# Patient Record
Sex: MALE | Race: BLACK OR AFRICAN AMERICAN | NOT HISPANIC OR LATINO | Employment: FULL TIME | URBAN - METROPOLITAN AREA
[De-identification: names, ages, dates, MRNs, and addresses within clinical notes are randomized per-mention and may not be internally consistent; named-entity substitution may affect disease eponyms.]

---

## 2021-12-27 ENCOUNTER — HISTORICAL (OUTPATIENT)
Dept: ADMINISTRATIVE | Facility: HOSPITAL | Age: 51
End: 2021-12-27

## 2022-01-06 ENCOUNTER — HISTORICAL (OUTPATIENT)
Dept: ADMINISTRATIVE | Facility: HOSPITAL | Age: 52
End: 2022-01-06

## 2022-04-10 ENCOUNTER — HISTORICAL (OUTPATIENT)
Dept: ADMINISTRATIVE | Facility: HOSPITAL | Age: 52
End: 2022-04-10
Payer: COMMERCIAL

## 2022-04-30 VITALS
DIASTOLIC BLOOD PRESSURE: 97 MMHG | HEIGHT: 73 IN | BODY MASS INDEX: 31.85 KG/M2 | OXYGEN SATURATION: 98 % | WEIGHT: 240.31 LBS | BODY MASS INDEX: 31.85 KG/M2 | WEIGHT: 240.31 LBS | SYSTOLIC BLOOD PRESSURE: 157 MMHG | DIASTOLIC BLOOD PRESSURE: 93 MMHG | SYSTOLIC BLOOD PRESSURE: 160 MMHG | WEIGHT: 240.31 LBS | BODY MASS INDEX: 31.85 KG/M2 | DIASTOLIC BLOOD PRESSURE: 101 MMHG | HEIGHT: 73 IN | HEIGHT: 73 IN | SYSTOLIC BLOOD PRESSURE: 160 MMHG

## 2022-05-02 NOTE — HISTORICAL [facility]ER
This is a historical note converted from [employer]. Formatting and pictures may have been removed.  Please reference [employer] for original formatting and attached multimedia. Chief Complaint  knee pain, left. Playing basket ball  History of Present Illness  51-year-old male presents with?pain to the?left knee.? States twisted knee while playing basketball?2 days ago.  Review of Systems  Constitutional_no fever, fatigue, weakness  Musculoskeletal_left knee pain  Integumentary_no skin rash or abnormal lesion  Neurologic_no headache, no dizziness, no weakness or numbness  ?  Physical Exam  Vitals & Measurements  T:?37? ?C (Tympanic)? HR:?104(Peripheral)? RR:?20? BP:?160/97? SpO2:?98%?  HT:?185.00?cm? WT:?109.000?kg? BMI:?31.85?  General_well-developed well-nourished in no acute distress  Musculoskeletal_tenderness to palpation to the medial?left knee. ?Mild swelling without abrasion contusion or deformity.? Adequate sensation strong distal pulses  Integumentary_no rashes or skin lesions present  Neurologic_ cranial nerves intact, no signs of peripheral neurological deficit, motor/sensory function intact?  ?  Assessment/Plan  1.?Left knee pain?M25.562  Modify activity as necessary,?wear knee support  Use medications either over-the-counter or prescription as prescribed/needed  Contact this clinic or your primary care provider if not improved with this treatment plan over the next 7 days  We will notify of the final radiology x-ray report result. ?I will refer to orthopedics.  Ordered:  diclofenac, 75 mg = 1 tab(s), Oral, BID, # 20 tab(s), 0 Refill(s), Pharmacy: [id number], 185, cm, Height/Length Dosing, 12/27/21 10:49:00 CST, 109, kg, Weight Dosing, 12/27/21 10:49:00 CST  Office/Outpatient Visit Level 3 New [id number] PC, Left knee pain, 12/27/21 11:52:00 CST  Patella Sleeve () PC, 12/27/21 11:52:00 CST, [facility] [place], Routine, 12/27/21 11:52:00 CST, Left knee pain  ?  Referrals  Ambulatory  Referral, Specialty: Orthopedic Surgery, Start: 12/27/21 11:51:00 CST, Left knee pain   Problem List/Past Medical History  Ongoing  HTN - Hypertension  Obesity  Historical  Hypertension  Procedure/Surgical History  None   Medications  AMLODIPINE BESYLATE 10 MG TAB, 10 mg= 1 tab(s), Oral, Daily  METOPROLOL SUCC ER 50 MG TAB, 50 mg= 1 tab(s), Oral, Daily  Allergies  No Known Medication Allergies  Social History  Abuse/Neglect  No, 12/27/2021  Alcohol  Past, 12/09/2017  Tobacco  Never (less than 100 in lifetime), No, 12/27/2021  Never smoker, 12/09/2017  Family History  Heart attack: Father.  Health Maintenance  Health Maintenance  ???Pending?(in the next year)  ??? ??OverDue  ??? ? ? ?Alcohol Misuse Screening due??01/02/21??and every 1??year(s)  ??? ??Due?  ??? ? ? ?ADL Screening due??12/27/21??and every 1??year(s)  ??? ? ? ?Aspirin Therapy for CVD Prevention due??12/27/21??and every 1??year(s)  ??? ? ? ?Hypertension Management-Education due??12/27/21??and every 1??year(s)  ??? ? ? ?Tetanus Vaccine due??12/27/21??and every 10??year(s)  ??? ??Due In Future?  ??? ? ? ?Obesity Screening not due until??01/01/22??and every 1??year(s)  ???Satisfied?(in the past 1 year)  ??? ??Satisfied?  ??? ? ? ?Blood Pressure Screening on??12/27/21.??Satisfied by Jodie Stafford.  ??? ? ? ?Body Mass Index Check on??12/27/21.??Satisfied by Jodie Stafford.  ??? ? ? ?Hypertension Management-Blood Pressure on??12/27/21.??Satisfied by Jodie Stafford.  ??? ? ? ?Influenza Vaccine on??12/27/21.??Satisfied by Jodie Stafford.  ??? ? ? ?Obesity Screening on??12/27/21.??Satisfied by Jodie Stafford  ?

## 2022-05-02 NOTE — HISTORICAL [facility]
This is a historical note converted from [employer]. Formatting and pictures may have been removed.  Please reference [employer] for original formatting and attached multimedia. Chief Complaint  NP PRESENTS FOR L KNEE INJURED WHILE PLAYING BASKETBALL ON 12/25/21  History of Present Illness  Patient is a 51-year-old male who presents to?the office today complaining of left knee pain.? He was playing basketball?on?December 25, 2021?when he?twisted his knee?developed pain in the medial aspect of the left knee.? He was seen at an urgent care?2 days after this and was?started on?diclofenac.? He states that the pain in the medial aspect of his knee has been improving.? He describes pain as a dull, aching type pain.? It is worse with standing for long period of time with walking for long distances.? It is alleviated with rest and ice. ?Stiff first thing in the morning?and then improves as his knee warms up.? He, rates pain at?4 out of 10 in intensity.? It does affect his actives of daily living and his ability to walk for long distances.? He denies any?fever, chills, night sweats. ?Denies any redness, warmth, swelling about the left knee.? Is here today for evaluation recommendations regarding his left knee pain.? Standing AP, Rangel, and sunrise views of bilateral knees and lateral view of the left knee were obtained in the office today.  Review of Systems  Constitutional: No fever, fatigue, weakness  Eye: No vision loss, eye redness, drainage, or pain  ENMT: No sore throat, ear pain, sinus pain/congestion, nasal congestion/drainage  Respiratory: No cough, no wheezing, no shortness of breath  Cardiovascular: No chest pain, no palpitations, no edema  Gastrointestinal: No nausea, vomiting, or diarrhea. ?No abdominal pain  Genitourinary: No dysuria, no urinary frequency or urgency, no hematuria  Hema/lymph: No abnormal bruising or bleeding  Endocrine: No heat or cold intolerance, no excessive thirst or excessive  urination  Musculoskeletal: Left knee pain  Integumentary: No skin rash or abnormal lesion  Neurologic: No headache, no dizziness, no weakness or numbness  Physical Exam  Vitals & Measurements  BP:?160/101?  HT:?185.00?cm? WT:?109.000?kg? BMI:?31.85?  General: No acute distress, alert and oriented, healthy appearing  HEENT: Head is atraumatic, mucous membranes are moist  Neck: Supple, no JVD  Cardiovascular: Palpable dorsalis pedis and posterior tibial pulses, regular rate and rhythm to those pulses  Lungs: No increased work of breathing  Skin: No rashes appreciated  Neurologic: Can flex and extend knees, ankles, and toes. ?Sensation is grossly intact  ?  Focused orthopedic exam:  Left?knee Exam:  Skin intact. ?No wounds over the knee. ?No deformity. ?No swelling. ?No erythema or warmth about the knee. ?No drainage.  Tender to palpation over medial joint line, medial hamstring insertion  No effusion present  Flexion to 135 degrees  Extension to 0 degrees  Stable to varus stress. ?Stable to valgus stress.  Firm endpoint Lachman exam. ?Firm endpoint posterior drawer test.  Negative McMurrays test  No?crepitus with flexion and extension of the knee  Sensation intact to light touch over the dorsal and plantar aspects of the foot.  Dorsiflexion and plantarflexion intact at the ankle and hallux.  Palpable dorsalis pedis pulse.  Assessment/Plan  1.?Left hamstring muscle strain?S76.312A  ?Patient is a 51-year-old male with?medial knee pain after twisting his knee while playing basketball.? Does not really have much swelling in the left knee at this time. ?His pain has improved significantly since the injury.? He has clinical findings consistent with?a strain of the insertion of the medial hamstring of the left knee as well as?medial meniscal injury. ?As result of recommended conservative management at this time. ?To continue to weight-bear as tolerated to the left lower extremity.? He was given a prescription for?physical  therapy for home exercise program?to work on stretching and strengthening of the muscles in his left leg.? He was given a prescription for?diclofenac gel to help with the inflammation.? I will see him back in the office in 1 week to reevaluate his left knee?and his progress with his home exercise program.? No new x-rays are needed at that office visit.? Patient was educated on his diagnosis prognosis. ?All questions were answered to his satisfaction.  ?  Components of this note were documented using voice recognition systems and are subject to errors not corrected at proof reading. ?Please contact the author for any clarifications.  Ordered:  diclofenac topical, 1 renetta, TOP, QID, PRN PRN as needed for pain, # 100 gm, 0 Refill(s), [facility]acy: [id number], 185, cm, Height/Length Dosing, 01/06/22 9:31:00 CST, 109, kg, Weight Dosing, 01/06/22 9:31:00 CST  Clinic Follow up, *Est. 01/13/22 3:00:00 CST, Order for future visit, Left hamstring muscle strain  Acute meniscal injury of left knee  Left knee pain, Orthopaedics  Office/Outpatient Visit Level 3 New [id number] PC, Left hamstring muscle strain  Acute meniscal injury of left knee  Left knee pain, Orthopaedics Clinic, 01/06/22 9:59:00 CST  ?  2.?Acute meniscal injury of left knee?S83.8X2A  Ordered:  diclofenac topical, 1 renetta, TOP, QID, PRN PRN as needed for pain, # 100 gm, 0 Refill(s), [facility]acy: [facility] #[id number], 185, cm, Height/Length Dosing, 01/06/22 9:31:00 CST, 109, kg, Weight Dosing, 01/06/22 9:31:00 CST  Clinic Follow up, *Est. 01/13/22 3:00:00 CST, Order for future visit, Left hamstring muscle strain  Acute meniscal injury of left knee  Left knee pain, Orthopaedics  Office/Outpatient Visit Level 3 New [id number] PC, Left hamstring muscle strain  Acute meniscal injury of left knee  Left knee pain, Orthopaedics Clinic, 01/06/22 9:59:00 CST  ?  Orders:  XR Knee Left 4 or More Views, Routine, 01/06/22 9:37:00 CST, None, Ambulatory, Rad  Type, Left knee pain, Not Scheduled, 01/06/22 9:37:00 CST  Referrals  PT/OT Ambulatory Referral, Specialty: Physical Therapy, Reason: left hamstring strain left meniscal injury, Start: 01/06/22 10:00:00 CST, Home Exercise program, Instructions: ****General PT Orders****, Left hamstring muscle strain  Acute meniscal injury of left knee  Left kn...  Clinic Follow up, *Est. 01/13/22 3:00:00 CST, Order for future visit, Left hamstring muscle strain  Acute meniscal injury of left knee  Left knee pain, [facility]   Problem List/Past Medical History  Ongoing  HTN - Hypertension  Obesity  Historical  Hypertension  Procedure/Surgical History  None   Medications  AMLODIPINE BESYLATE 10 MG TAB, 10 mg= 1 tab(s), Oral, Daily  diclofenac 1% topical gel, 1 renetta, TOP, QID, PRN  diclofenac sodium 75 mg oral delayed release tablet, 75 mg= 1 tab(s), Oral, BID  METOPROLOL SUCC ER 50 MG TAB, 50 mg= 1 tab(s), Oral, Daily  Allergies  No Known Medication Allergies  Social History  Abuse/Neglect  No, 12/27/2021  Alcohol  Past, 12/09/2017  Tobacco  Never (less than 100 in lifetime), No, 12/27/2021  Never smoker, 12/09/2017  Family History  Heart attack: Father.  Health Maintenance  Health Maintenance  ???Pending?(in the next year)  ??? ??OverDue  ??? ? ? ?Hypertension Management-BMP due??08/09/13??and every 1??year(s)  ??? ? ? ?Diabetes Screening due??08/09/15??and every 3??year(s)  ??? ? ? ?Influenza Vaccine due??10/01/21??and every 1??day(s)  ??? ??Due?  ??? ? ? ?Alcohol Misuse Screening due??01/02/22??and every 1??year(s)  ??? ? ? ?ADL Screening due??01/06/22??and every 1??year(s)  ??? ? ? ?Aspirin Therapy for CVD Prevention due??01/06/22??and every 1??year(s)  ??? ? ? ?Colorectal Screening due??01/06/22??Unknown Frequency  ??? ? ? ?Depression Screening due??01/06/22??Unknown Frequency  ??? ? ? ?Hypertension Management-Education due??01/06/22??and every 1??year(s)  ??? ? ? ?Lipid Screening due??01/06/22??Unknown Frequency  ??? ? ?  ?Tetanus Vaccine due??01/06/22??and every 10??year(s)  ??? ? ? ?Zoster Vaccine due??01/06/22??Unknown Frequency  ??? ??Due In Future?  ??? ? ? ?Obesity Screening not due until??01/01/23??and every 1??year(s)  ???Satisfied?(in the past 1 year)  ??? ??Satisfied?  ??? ? ? ?Blood Pressure Screening on??01/06/22.??Satisfied by [facility]  ??? ? ? ?Body Mass Index Check on??01/06/22.??Satisfied by [facility]  ??? ? ? ?Hypertension Management-Blood Pressure on??01/06/22.??Satisfied by [facility]  ??? ? ? ?Influenza Vaccine on??01/06/22.??Satisfied by [facility]  ??? ? ? ?Obesity Screening on??01/06/22.??Satisfied by [facility]  ?  Diagnostic Results  Standing AP, Rangel, and sunrise view of the bilateral knees and lateral view of the left knee were obtained in the office today.? These show no acute osseous abnormality.? No fracture.? Mild joint space narrowing of the medial compartment of the left knee.? Osteophyte formation about the medial tibial condyle.? Mild degenerative changes in the left knee.

## 2022-06-06 ENCOUNTER — OFFICE VISIT (OUTPATIENT)
Dept: URGENT CARE | Facility: CLINIC | Age: 52
End: 2022-06-06
Payer: COMMERCIAL

## 2022-06-06 VITALS
OXYGEN SATURATION: 98 % | SYSTOLIC BLOOD PRESSURE: 169 MMHG | BODY MASS INDEX: 28.44 KG/M2 | HEIGHT: 72 IN | DIASTOLIC BLOOD PRESSURE: 107 MMHG | HEART RATE: 85 BPM | TEMPERATURE: 98 F | WEIGHT: 210 LBS

## 2022-06-06 DIAGNOSIS — M54.50 ACUTE RIGHT-SIDED LOW BACK PAIN WITHOUT SCIATICA: ICD-10-CM

## 2022-06-06 LAB
BILIRUB UR QL STRIP: NEGATIVE
GLUCOSE UR QL STRIP: NEGATIVE
KETONES UR QL STRIP: NEGATIVE
LEUKOCYTE ESTERASE UR QL STRIP: NEGATIVE
PH, POC UA: 5
POC BLOOD, URINE: NEGATIVE
POC NITRATES, URINE: NEGATIVE
PROT UR QL STRIP: NEGATIVE
SP GR UR STRIP: 1.02 (ref 1–1.03)
UROBILINOGEN UR STRIP-ACNC: NORMAL (ref 0.3–2.2)

## 2022-06-06 PROCEDURE — 1160F RVW MEDS BY RX/DR IN RCRD: CPT | Mod: CPTII,,, | Performed by: NURSE PRACTITIONER

## 2022-06-06 PROCEDURE — 3077F SYST BP >= 140 MM HG: CPT | Mod: CPTII,,, | Performed by: NURSE PRACTITIONER

## 2022-06-06 PROCEDURE — 1159F PR MEDICATION LIST DOCUMENTED IN MEDICAL RECORD: ICD-10-PCS | Mod: CPTII,,, | Performed by: NURSE PRACTITIONER

## 2022-06-06 PROCEDURE — 3008F BODY MASS INDEX DOCD: CPT | Mod: CPTII,,, | Performed by: NURSE PRACTITIONER

## 2022-06-06 PROCEDURE — 1159F MED LIST DOCD IN RCRD: CPT | Mod: CPTII,,, | Performed by: NURSE PRACTITIONER

## 2022-06-06 PROCEDURE — 81003 POCT URINALYSIS, DIPSTICK, AUTOMATED, W/O SCOPE: ICD-10-PCS | Mod: QW,,, | Performed by: NURSE PRACTITIONER

## 2022-06-06 PROCEDURE — 99213 PR OFFICE/OUTPT VISIT, EST, LEVL III, 20-29 MIN: ICD-10-PCS | Mod: ,,, | Performed by: NURSE PRACTITIONER

## 2022-06-06 PROCEDURE — 99213 OFFICE O/P EST LOW 20 MIN: CPT | Mod: ,,, | Performed by: NURSE PRACTITIONER

## 2022-06-06 PROCEDURE — 4010F ACE/ARB THERAPY RXD/TAKEN: CPT | Mod: CPTII,,, | Performed by: NURSE PRACTITIONER

## 2022-06-06 PROCEDURE — 4010F PR ACE/ARB THEARPY RXD/TAKEN: ICD-10-PCS | Mod: CPTII,,, | Performed by: NURSE PRACTITIONER

## 2022-06-06 PROCEDURE — 3080F PR MOST RECENT DIASTOLIC BLOOD PRESSURE >= 90 MM HG: ICD-10-PCS | Mod: CPTII,,, | Performed by: NURSE PRACTITIONER

## 2022-06-06 PROCEDURE — 1160F PR REVIEW ALL MEDS BY PRESCRIBER/CLIN PHARMACIST DOCUMENTED: ICD-10-PCS | Mod: CPTII,,, | Performed by: NURSE PRACTITIONER

## 2022-06-06 PROCEDURE — 81003 URINALYSIS AUTO W/O SCOPE: CPT | Mod: QW,,, | Performed by: NURSE PRACTITIONER

## 2022-06-06 PROCEDURE — 3077F PR MOST RECENT SYSTOLIC BLOOD PRESSURE >= 140 MM HG: ICD-10-PCS | Mod: CPTII,,, | Performed by: NURSE PRACTITIONER

## 2022-06-06 PROCEDURE — 3008F PR BODY MASS INDEX (BMI) DOCUMENTED: ICD-10-PCS | Mod: CPTII,,, | Performed by: NURSE PRACTITIONER

## 2022-06-06 PROCEDURE — 3080F DIAST BP >= 90 MM HG: CPT | Mod: CPTII,,, | Performed by: NURSE PRACTITIONER

## 2022-06-06 RX ORDER — AMLODIPINE BESYLATE 5 MG/1
5 TABLET ORAL EVERY MORNING
COMMUNITY
Start: 2022-04-12

## 2022-06-06 RX ORDER — DICLOFENAC SODIUM 75 MG/1
75 TABLET, DELAYED RELEASE ORAL 2 TIMES DAILY PRN
Qty: 20 TABLET | Refills: 0 | Status: SHIPPED | OUTPATIENT
Start: 2022-06-06 | End: 2022-06-16

## 2022-06-06 RX ORDER — METHYLPREDNISOLONE 4 MG/1
TABLET ORAL
Qty: 21 EACH | Refills: 0 | Status: SHIPPED | OUTPATIENT
Start: 2022-06-06

## 2022-06-06 RX ORDER — CYCLOBENZAPRINE HCL 10 MG
10 TABLET ORAL 3 TIMES DAILY PRN
Qty: 30 TABLET | Refills: 0 | Status: SHIPPED | OUTPATIENT
Start: 2022-06-06 | End: 2022-06-16

## 2022-06-06 NOTE — PATIENT INSTRUCTIONS
Modify activity and gentle stretching  Take prescription medication as directed or  Tylenol  OTC as directed for pain  Follow-up with your primary care provider if symptoms worsen or persist as instructed

## 2022-06-06 NOTE — PROGRESS NOTES
Subjective:       Patient ID: Sreekanth Coto is a 52 y.o. male.    Vitals:  height is 6' (1.829 m) and weight is 95.3 kg (210 lb). His temperature is 98.4 °F (36.9 °C). His blood pressure is 169/107 (abnormal) and his pulse is 85. His oxygen saturation is 98%.     Chief Complaint: Back Pain (Lower right sided back pain.)    Lower back pain started about a week ago. Denies any falls or injuries.     Back Pain        Musculoskeletal: Positive for back pain.       Objective:      Physical Exam   Constitutional: He is oriented to person, place, and time. He appears well-developed. He is cooperative. No distress.   HENT:   Head: Normocephalic and atraumatic.   Nose: Nose normal.   Mouth/Throat: Oropharynx is clear and moist and mucous membranes are normal.   Eyes: Conjunctivae and lids are normal.   Neck: Trachea normal and phonation normal. Neck supple.   Cardiovascular: Normal rate, regular rhythm, normal heart sounds and normal pulses.   Pulmonary/Chest: Effort normal and breath sounds normal.   Abdominal: Normal appearance and bowel sounds are normal. He exhibits no abdominal bruit, no pulsatile midline mass and no mass. Soft.   Musculoskeletal:         General: No deformity.   Neurological: He is alert and oriented to person, place, and time. He has normal strength and normal reflexes. No sensory deficit.   Skin: Skin is warm, dry, intact and not diaphoretic.   Psychiatric: His speech is normal and behavior is normal. Judgment and thought content normal.   Nursing note and vitals reviewed.        Assessment:       1. Acute right-sided low back pain without sciatica          Plan:     Modify activity and gentle stretching  Take prescription medication as directed or  Tylenol  OTC as directed for pain  Follow-up with your primary care provider if symptoms worsen or persist as instructed    Acute right-sided low back pain without sciatica  -     POCT Urinalysis, Dipstick, Automated, W/O Scope  -     diclofenac  (VOLTAREN) 75 MG EC tablet; Take 1 tablet (75 mg total) by mouth 2 (two) times daily as needed (pain).  Dispense: 20 tablet; Refill: 0  -     cyclobenzaprine (FLEXERIL) 10 MG tablet; Take 1 tablet (10 mg total) by mouth 3 (three) times daily as needed for Muscle spasms.  Dispense: 30 tablet; Refill: 0  -     methylPREDNISolone (MEDROL DOSEPACK) 4 mg tablet; use as directed  Dispense: 21 each; Refill: 0